# Patient Record
Sex: FEMALE | Race: WHITE | NOT HISPANIC OR LATINO | URBAN - METROPOLITAN AREA
[De-identification: names, ages, dates, MRNs, and addresses within clinical notes are randomized per-mention and may not be internally consistent; named-entity substitution may affect disease eponyms.]

---

## 2021-12-08 ENCOUNTER — NEW PATIENT (OUTPATIENT)
Dept: URBAN - METROPOLITAN AREA CLINIC 4 | Facility: CLINIC | Age: 76
End: 2021-12-08

## 2021-12-08 DIAGNOSIS — E11.9: ICD-10-CM

## 2021-12-08 DIAGNOSIS — H02.834: ICD-10-CM

## 2021-12-08 DIAGNOSIS — H02.831: ICD-10-CM

## 2021-12-08 DIAGNOSIS — H25.11: ICD-10-CM

## 2021-12-08 PROCEDURE — 92004 COMPRE OPH EXAM NEW PT 1/>: CPT

## 2021-12-08 PROCEDURE — 92015 DETERMINE REFRACTIVE STATE: CPT

## 2021-12-08 ASSESSMENT — TONOMETRY
OS_IOP_MMHG: 18
OD_IOP_MMHG: 18

## 2021-12-08 ASSESSMENT — KERATOMETRY
OD_AXISANGLE_DEGREES: 147
OS_K1POWER_DIOPTERS: 43.75
OD_AXISANGLE2_DEGREES: 57
OS_K2POWER_DIOPTERS: 46.75
OD_K1POWER_DIOPTERS: 45.0
OS_AXISANGLE2_DEGREES: 50
OS_AXISANGLE_DEGREES: 140
OD_K2POWER_DIOPTERS: 45.25

## 2021-12-08 ASSESSMENT — VISUAL ACUITY
OU_SC: 20/200
OD_SC: CF 3FT
OS_SC: 20/400

## 2021-12-20 ENCOUNTER — PRE-OP/H&P (OUTPATIENT)
Dept: URBAN - METROPOLITAN AREA CLINIC 4 | Facility: CLINIC | Age: 76
End: 2021-12-20

## 2021-12-20 VITALS — HEART RATE: 73 BPM | SYSTOLIC BLOOD PRESSURE: 178 MMHG | HEIGHT: 55 IN | DIASTOLIC BLOOD PRESSURE: 86 MMHG

## 2021-12-20 DIAGNOSIS — E11.9: ICD-10-CM

## 2021-12-20 DIAGNOSIS — H02.834: ICD-10-CM

## 2021-12-20 DIAGNOSIS — H25.11: ICD-10-CM

## 2021-12-20 DIAGNOSIS — H02.831: ICD-10-CM

## 2021-12-20 PROCEDURE — 99211PRE PRE OP VISIT

## 2021-12-20 PROCEDURE — 92136 OPHTHALMIC BIOMETRY: CPT

## 2021-12-30 ENCOUNTER — POST-OP (OUTPATIENT)
Dept: URBAN - METROPOLITAN AREA CLINIC 4 | Facility: CLINIC | Age: 76
End: 2021-12-30

## 2021-12-30 DIAGNOSIS — Z96.1: ICD-10-CM

## 2021-12-30 PROCEDURE — 99024 POSTOP FOLLOW-UP VISIT: CPT

## 2021-12-30 ASSESSMENT — VISUAL ACUITY
OD_SC: 20/70-1
OU_SC: 20/70-1

## 2021-12-30 ASSESSMENT — TONOMETRY: OD_IOP_MMHG: 21

## 2022-01-12 ENCOUNTER — POST-OP (OUTPATIENT)
Dept: URBAN - METROPOLITAN AREA CLINIC 4 | Facility: CLINIC | Age: 77
End: 2022-01-12

## 2022-01-12 DIAGNOSIS — H02.831: ICD-10-CM

## 2022-01-12 DIAGNOSIS — H02.834: ICD-10-CM

## 2022-01-12 DIAGNOSIS — E11.9: ICD-10-CM

## 2022-01-12 DIAGNOSIS — Z96.1: ICD-10-CM

## 2022-01-12 PROCEDURE — 99024 POSTOP FOLLOW-UP VISIT: CPT

## 2022-01-12 ASSESSMENT — VISUAL ACUITY
OU_SC: 20/40
OD_SC: 20/30

## 2022-06-19 RX ORDER — LEVOTHYROXINE SODIUM 0.05 MG/1
TABLET ORAL
COMMUNITY

## 2022-06-19 RX ORDER — ATORVASTATIN CALCIUM 10 MG/1
TABLET, FILM COATED ORAL
COMMUNITY

## 2022-06-19 RX ORDER — BUPROPION HYDROCHLORIDE 300 MG/1
TABLET ORAL
COMMUNITY

## 2022-06-19 RX ORDER — METHYLPHENIDATE HYDROCHLORIDE 20 MG/1
TABLET ORAL
COMMUNITY

## 2022-06-19 RX ORDER — LISINOPRIL 40 MG/1
TABLET ORAL
COMMUNITY

## 2022-06-19 RX ORDER — FUROSEMIDE 40 MG/1
TABLET ORAL
COMMUNITY
Start: 2021-12-01

## 2022-06-19 RX ORDER — SERTRALINE HYDROCHLORIDE 100 MG/1
TABLET, FILM COATED ORAL
COMMUNITY

## 2022-06-19 RX ORDER — SPIRONOLACTONE AND HYDROCHLOROTHIAZIDE 25; 25 MG/1; MG/1
TABLET ORAL
COMMUNITY

## 2022-06-19 RX ORDER — MELOXICAM 15 MG/1
TABLET ORAL
COMMUNITY

## 2022-06-19 RX ORDER — SULFAMETHOXAZOLE AND TRIMETHOPRIM 800; 160 MG/1; MG/1
TABLET ORAL
COMMUNITY

## 2022-06-19 RX ORDER — PANTOPRAZOLE SODIUM 40 MG/1
TABLET, DELAYED RELEASE ORAL
COMMUNITY

## 2022-12-27 NOTE — PATIENT DISCUSSION
12/27/22: Based on today’s exam, diagnostic studies, and review of records, and the patient’s functional difficulty which appear to be a result of the MACULAR HOLE, the recommendation as made for a VITRECTOMY with ERM/ILM peel. Discussed benefits, alternatives, and risks of surgery including (but not limited to) cataract (if natural lens is still present), infection, bleeding, retinal detachment, optic neuropathy, loss of vision, blindness, and loss of eye. Patient was told the vision may not return to the same level as prior to development of the [place] but should improve as the anatomy returns to a more normal contour. Patient understands most patients end up with some distortion even after successful macular hole surgery. No prediction of the level of visual improvement and/or the degree of distortion reduction can be given.

## 2024-04-16 NOTE — PATIENT DISCUSSION
Hold Bumex and potassium until complete resolution of patient's diarrhea improvement of appetite   Today's exam, diagnostic studies, and/or review of records show NO SIGNIFICANT CHANGE from previous exams.b (per patient's report).